# Patient Record
Sex: MALE | Race: WHITE | Employment: UNEMPLOYED | URBAN - NONMETROPOLITAN AREA
[De-identification: names, ages, dates, MRNs, and addresses within clinical notes are randomized per-mention and may not be internally consistent; named-entity substitution may affect disease eponyms.]

---

## 2023-03-03 ENCOUNTER — HOSPITAL ENCOUNTER (OUTPATIENT)
Dept: PSYCHIATRY | Age: 28
Setting detail: THERAPIES SERIES
Discharge: HOME | End: 2023-03-03

## 2023-03-03 PROCEDURE — 80305 DRUG TEST PRSMV DIR OPT OBS: CPT

## 2023-03-03 PROCEDURE — 90791 PSYCH DIAGNOSTIC EVALUATION: CPT

## 2023-03-03 ASSESSMENT — ANXIETY QUESTIONNAIRES
4. TROUBLE RELAXING: 0
1. FEELING NERVOUS, ANXIOUS, OR ON EDGE: 0
7. FEELING AFRAID AS IF SOMETHING AWFUL MIGHT HAPPEN: 0
3. WORRYING TOO MUCH ABOUT DIFFERENT THINGS: 0
GAD7 TOTAL SCORE: 0
IF YOU CHECKED OFF ANY PROBLEMS ON THIS QUESTIONNAIRE, HOW DIFFICULT HAVE THESE PROBLEMS MADE IT FOR YOU TO DO YOUR WORK, TAKE CARE OF THINGS AT HOME, OR GET ALONG WITH OTHER PEOPLE: NOT DIFFICULT AT ALL
2. NOT BEING ABLE TO STOP OR CONTROL WORRYING: 0
5. BEING SO RESTLESS THAT IT IS HARD TO SIT STILL: 0
6. BECOMING EASILY ANNOYED OR IRRITABLE: 0
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
## 2023-03-10 ENCOUNTER — HOSPITAL ENCOUNTER (OUTPATIENT)
Dept: PSYCHIATRY | Age: 28
Setting detail: THERAPIES SERIES
Discharge: HOME | End: 2023-03-10

## 2023-03-10 PROCEDURE — 90834 PSYTX W PT 45 MINUTES: CPT

## 2023-03-10 NOTE — PROGRESS NOTES
[facility]                Progress Note    [] [place]  [x] Keerthi prasad                    Patient Name: Jose Bradley   : 1995     Case # :  [id number]  Therapist: Fletcher Gomez [facility]        Objective/Service/Time:    IT 1.0 Topic: Treatment Planning  S- Client Reports, has job interview today, discussing amnesty program with his PO. Client researching \"weed\" setting boundaries with his roommates to work. O- Client oriented, sleep dreaming more sober from Dev, cooperative    A- Client and this writer discussed his treatment with Dr. Jere Rodriguez, Client discussed 3 treatment goals and level 1 counselling. Client was surprised how high his levels of cannabis are. Client denies any motivation or current emotional issues. Client enjoys meditation was interested to the [facility] device. P- Client will attend Group on Thursday and IT on Friday.                   Fletcher Gomez MA, MILDRED, SHAKEELW, MAC 03/10/23, 2:16 PM
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
## 2023-03-16 ENCOUNTER — HOSPITAL ENCOUNTER (OUTPATIENT)
Dept: PSYCHIATRY | Age: 28
Setting detail: THERAPIES SERIES
Discharge: HOME | End: 2023-03-16

## 2023-03-16 PROCEDURE — 90853 GROUP PSYCHOTHERAPY: CPT

## 2023-03-16 NOTE — GROUP NOTE
612 [facility] Group Therapy Note      3/16/2023    Location:  [place]    Clients Presents: [phone]     Clients Absent: [phone]     Length of session: 1.5 hours    Group Note: OP    Group Type: Co-Ed    New members were welcomed and introduced. Norms and expectations of group were discussed. Content: Clients discussed Changes they want to make in their sober journey, they completed and discussed Smart Recovery CBT. Clients watched and discussed Austyn Patel and Knippanamrata Mcclelland sober journeys and changes they made. Osiel Nobles  3/16/2023 11:44 AM    Co-Therapist: N/A      [facility] Individual Group Progress Note    Jacy Romano  1995  3/16/2023    Notes on Client Progress in Group    Client late arrival confused on his first group, Client stayed after to discuss group content and what he missed. Client goal is to be sober, stay focused on his job and get off probation. Client was tired from working into the night. Client focused, and participated in the discussions. 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     Osiel Nobles  3/16/2023 11:50 AM    Co-Therapist: N/A

## 2023-03-17 ENCOUNTER — APPOINTMENT (OUTPATIENT)
Dept: PSYCHIATRY | Age: 28
End: 2023-03-17

## 2023-03-17 ENCOUNTER — HOSPITAL ENCOUNTER (OUTPATIENT)
Dept: PSYCHIATRY | Age: 28
Setting detail: THERAPIES SERIES
Discharge: HOME | End: 2023-03-17

## 2023-03-17 PROCEDURE — 90834 PSYTX W PT 45 MINUTES: CPT
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
## 2023-03-23 ENCOUNTER — HOSPITAL ENCOUNTER (OUTPATIENT)
Dept: PSYCHIATRY | Age: 28
Setting detail: THERAPIES SERIES
Discharge: HOME | End: 2023-03-23

## 2023-03-23 PROCEDURE — 90853 GROUP PSYCHOTHERAPY: CPT

## 2023-03-23 NOTE — GROUP NOTE
612 [facility] Group Therapy Note      3/23/2023    Location:  Radha Albright    Clients Presents: [phone]    Clients Absent: [phone]    Length of session: 1.5 hours    Group Note: OP    Group Type: Co-Ed    New members were welcomed and introduced. Norms and expectations of group were discussed. Content: Client's discussed the Phases of addiction and based upon their current and past recovery journey. Client's discussed the [employer], Logical Mind and Emotional Mind. [facility]  3/23/2023 11:31 AM    Co-Therapist: N/A      [facility] Individual Group Progress Note    Sadie Baumann  1995  3/23/2023    Notes on Client Progress in Group  Client staying sober, client shared physical symptoms from withdrawing from \"pot\". Client is focused on work, shared how much pot he used and how he currently is staying focused and sober.     [facility]ino [facility]  3/23/2023 11:36 AM    Co-Therapist: N/A

## 2023-03-24 ENCOUNTER — APPOINTMENT (OUTPATIENT)
Dept: PSYCHIATRY | Age: 28
End: 2023-03-24

## 2023-03-24 ENCOUNTER — HOSPITAL ENCOUNTER (OUTPATIENT)
Dept: PSYCHIATRY | Age: 28
Setting detail: THERAPIES SERIES
Discharge: HOME | End: 2023-03-24

## 2023-03-24 PROCEDURE — 90834 PSYTX W PT 45 MINUTES: CPT

## 2023-03-24 PROCEDURE — 80305 DRUG TEST PRSMV DIR OPT OBS: CPT

## 2023-03-30 ENCOUNTER — HOSPITAL ENCOUNTER (OUTPATIENT)
Dept: PSYCHIATRY | Age: 28
Setting detail: THERAPIES SERIES
Discharge: HOME | End: 2023-03-30

## 2023-03-30 PROCEDURE — 90853 GROUP PSYCHOTHERAPY: CPT

## 2023-03-30 NOTE — GROUP NOTE
612 [facility] Group Therapy Note      3/30/2023    Location:  [place]    Clients Presents: [phone]    Clients Absent: [phone]    Length of session: 1.5 hours    Group Note: OP    Group Type: Co-Ed    New members were welcomed and introduced. Norms and expectations of group were discussed. Content: Client's discussed Irena Zee Story of Change, Chain Breaker. Client's shared about conflicts per CBT, Helicopter view. Client began discussion on the Medical Impact drugs, alcohol and food have on the brain. [facility]  3/30/2023 11:21 AM    Co-Therapist: N/A      [facility] Individual Group Progress Note    Darlyn Santiago  1995  3/30/2023    Notes on Client Progress in Group    Client denies use, accepted feedback, told his story of getting fired using the [employer].        [facility]  3/30/2023 11:26 AM    Co-Therapist: N/A

## 2023-03-31 ENCOUNTER — HOSPITAL ENCOUNTER (OUTPATIENT)
Dept: PSYCHIATRY | Age: 28
Setting detail: THERAPIES SERIES
End: 2023-03-31

## 2023-03-31 ENCOUNTER — APPOINTMENT (OUTPATIENT)
Dept: PSYCHIATRY | Age: 28
End: 2023-03-31

## 2023-03-31 PROCEDURE — 90834 PSYTX W PT 45 MINUTES: CPT
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
## 2023-04-06 ENCOUNTER — HOSPITAL ENCOUNTER (OUTPATIENT)
Dept: PSYCHIATRY | Age: 28
Setting detail: THERAPIES SERIES
Discharge: HOME | End: 2023-04-06

## 2023-04-06 PROCEDURE — 90853 GROUP PSYCHOTHERAPY: CPT

## 2023-04-06 NOTE — GROUP NOTE
612 [facility] Group Therapy Note      4/6/2023    Location:  [place]    Clients Presents: [phone]    Clients Absent: [phone]    Length of session: 1.5 hours    Group Note: OP    Group Type: Co-Ed    New members were welcomed and introduced. Norms and expectations of group were discussed. Content: Client's shared and viewed DAbram 1983 Altha Denver speech on falling forward, Client's discussed the 12 Steps and Virtues, Client's viewed and discussed Father Vanessa Staff Step 1    Patricia [facility]  4/6/2023 11:22 AM    Co-Therapist: N/A      [facility] Individual Group Progress Note    Jacy Romano  1995 4/6/2023    Notes on Client Progress in Group    Client attentive, shared about his Xanax addiction, Client shared about the Virtue of Discipline and spirituality. Client enjoying his new job and sober living.     [facility]  4/6/2023 11:27 AM    Co-Therapist: N/A

## 2023-04-07 ENCOUNTER — APPOINTMENT (OUTPATIENT)
Dept: PSYCHIATRY | Age: 28
End: 2023-04-07
Payer: MEDICAID

## 2023-04-14 ENCOUNTER — APPOINTMENT (OUTPATIENT)
Dept: PSYCHIATRY | Age: 28
End: 2023-04-14
Payer: MEDICAID

## 2023-04-20 ENCOUNTER — HOSPITAL ENCOUNTER (OUTPATIENT)
Dept: PSYCHIATRY | Age: 28
Setting detail: THERAPIES SERIES
Discharge: HOME | End: 2023-04-20
Payer: MEDICAID
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
## 2023-04-21 ENCOUNTER — APPOINTMENT (OUTPATIENT)
Dept: PSYCHIATRY | Age: 28
End: 2023-04-21
Payer: MEDICAID

## 2023-04-21 ENCOUNTER — HOSPITAL ENCOUNTER (OUTPATIENT)
Dept: PSYCHIATRY | Age: 28
Setting detail: THERAPIES SERIES
Discharge: HOME | End: 2023-04-21
Payer: MEDICAID

## 2023-04-21 PROCEDURE — 90832 PSYTX W PT 30 MINUTES: CPT
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
## 2023-04-27 ENCOUNTER — HOSPITAL ENCOUNTER (OUTPATIENT)
Dept: PSYCHIATRY | Age: 28
Setting detail: THERAPIES SERIES
Discharge: HOME | End: 2023-04-27
Payer: MEDICAID

## 2023-04-27 PROCEDURE — 90853 GROUP PSYCHOTHERAPY: CPT
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
## 2023-04-28 ENCOUNTER — HOSPITAL ENCOUNTER (OUTPATIENT)
Dept: PSYCHIATRY | Age: 28
Setting detail: THERAPIES SERIES
Discharge: HOME | End: 2023-04-28
Payer: MEDICAID

## 2023-04-28 ENCOUNTER — APPOINTMENT (OUTPATIENT)
Dept: PSYCHIATRY | Age: 28
End: 2023-04-28
Payer: MEDICAID

## 2023-04-28 PROCEDURE — 90834 PSYTX W PT 45 MINUTES: CPT

## 2023-04-28 PROCEDURE — 80305 DRUG TEST PRSMV DIR OPT OBS: CPT
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
## 2023-05-04 ENCOUNTER — HOSPITAL ENCOUNTER (OUTPATIENT)
Dept: PSYCHIATRY | Age: 28
Setting detail: THERAPIES SERIES
Discharge: HOME | End: 2023-05-04
Payer: MEDICAID

## 2023-05-04 NOTE — GROUP NOTE
612 [facility] Group Therapy Note      5/4/2023    Location:  [place]    Clients Presents: [phone]    Clients Absent: [phone]    Length of session: 1.5 hours    Group Note: OP    Group Type: Co-Ed    New members were welcomed and introduced. Norms and expectations of group were discussed.     Content: Client shared and discussed  Taking Inventory and viewed & discussed Smart recovery Values video,   Client gave peer feedback and support    Osiel Carbajal  5/4/2023 11:59 AM    Co-Therapist: N/A      [facility] Individual Group Progress Note    Elian Bird  1995 5/4/2023    Notes on Client Progress in Group    Reason for Absence: CXL resides in [place] no ride today    Osiel Carbajal  5/4/2023 12:02 PM    Co-Therapist: N/A

## 2023-05-05 ENCOUNTER — APPOINTMENT (OUTPATIENT)
Dept: PSYCHIATRY | Age: 28
End: 2023-05-05
Payer: MEDICAID

## 2023-05-05 ENCOUNTER — HOSPITAL ENCOUNTER (OUTPATIENT)
Dept: PSYCHIATRY | Age: 28
Setting detail: THERAPIES SERIES
Discharge: HOME | End: 2023-05-05
Payer: MEDICAID

## 2023-05-05 PROCEDURE — 90832 PSYTX W PT 30 MINUTES: CPT

## 2023-05-05 NOTE — PROGRESS NOTES
[facility]                Progress Note    [] [place]  [x] Margarette Rowley                    Patient Name: Monika Deras   : 1995     Case # :  [id number]  Therapist: Antelmo Feliz [facility]        Objective/Service/Time:    IT   30 Minutes, Topics:  resources for sober supports, MH supports 1   The Client stated their name and soc #. Client agreed to Telehealth and reported in a Confidential setting. Client and counselor will call back if there is a disconnect. Client has the crisis # for  needs. S-Client reports, very encouraged about his UDS, motivated, clear minded since being off of marijuana. Client motivated by his new job and his mentor. Client states he is diligent in work, learning and doing extra's at the [address] Client is on PRN status with his Psychiatrist.   Client has not attending NA/AA,  is aware of the [facility] group. Client is waiting for Medical card. O- Client is oriented, focused engaged, interested in learning  A- Reviewed goals,  his health resources, and research on Marijuana & discussed benefits of AA/NA  P- Client working, reporting into PO, Client is going to network about glasses through the [employer].                   Antelmo Feilz MA, MILDRED, LSW, MAC 23, 12:10 PM

## 2023-05-11 ENCOUNTER — HOSPITAL ENCOUNTER (OUTPATIENT)
Dept: PSYCHIATRY | Age: 28
Setting detail: THERAPIES SERIES
Discharge: HOME | End: 2023-05-11
Payer: MEDICAID

## 2023-05-11 PROCEDURE — 90853 GROUP PSYCHOTHERAPY: CPT

## 2023-05-11 NOTE — GROUP NOTE
612 [facility] Group Therapy Note      5/11/2023    Location:  [facility]    Clients Presents: [phone]        Length of session: 1.5 hours    Group Note: OP    Group Type: Co-Ed    New members were welcomed and introduced. Norms and expectations of group were discussed. Content: Client's discussed and viewed the hope, resilience story of Rula Sanchez and the parallels to recovery. Client 's learned the CBT tool from Smart Recovery processing old beliefs, emotions, disputing old beliefs and developing new beliefs. [facility]  5/11/2023 11:58 AM    Co-Therapist: N/A      [facility] Individual Group Progress Note    [facility]  1995 5/11/2023    Notes on Client Progress in Group    Client was attentive, applied information, shared about benefits of him being sober and motivated.     [facility]  5/11/2023 12:02 PM    Co-Therapist: N/A

## 2023-05-12 ENCOUNTER — HOSPITAL ENCOUNTER (OUTPATIENT)
Dept: PSYCHIATRY | Age: 28
Setting detail: THERAPIES SERIES
Discharge: HOME | End: 2023-05-12
Payer: MEDICAID

## 2023-05-12 ENCOUNTER — APPOINTMENT (OUTPATIENT)
Dept: PSYCHIATRY | Age: 28
End: 2023-05-12
Payer: MEDICAID

## 2023-05-12 PROCEDURE — 90832 PSYTX W PT 30 MINUTES: CPT

## 2023-05-12 NOTE — PROGRESS NOTES
[facility]                Progress Note    [] [place]  [x] Keerthi prasad                    Patient Name: Yissel Victoria   : 1995     Case # :  [id number]  Therapist: Piper Baird [facility]        Objective/Service/Time:    IT      30 Minutes,        2 calls , 1216 pm, 1225 pm  The Client stated their name and soc #. Client agreed to Telehealth and reported in a Confidential setting. Client and counselor will call back if there is a disconnect. Client has the crisis # for  needs. 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 Client stated he walks to work, staying positive and changing his belief about abusing marijuana, at this time her reports he plans to [facility]" use it again. O-Client displays friendliness, shares freely and affect a lot of humor and laughing      A-Client and this writer discussed sober stories and inspirational stories. Client is interested in podcast and reading more about Jessica Crain sober from drugs and xanax.         P-Client attends IT, Gt, Works, monthly check ins, wanting to get his DL back and has a Psychiatrist MANUEL Baird MA, [facility], SHAKEEL, [facility] 23, 12:13 PM

## 2023-05-18 ENCOUNTER — HOSPITAL ENCOUNTER (OUTPATIENT)
Dept: PSYCHIATRY | Age: 28
Setting detail: THERAPIES SERIES
Discharge: HOME | End: 2023-05-18
Payer: MEDICAID

## 2023-05-18 PROCEDURE — 90853 GROUP PSYCHOTHERAPY: CPT

## 2023-05-18 PROCEDURE — 80305 DRUG TEST PRSMV DIR OPT OBS: CPT

## 2023-05-18 NOTE — GROUP NOTE
612 [facility] Group Therapy Note      5/18/2023    Location:  [employer]    Clients Presents: [phone]    Clients Absent: [phone]    Length of session: 1.5 hours    Group Note: OP    Group Type: Co-Ed    New members were welcomed and introduced. Norms and expectations of group were discussed. Content: Client's discussed correcting Negative thinking in relationship to maintaining a sober life. Client's watched the [employer] Story and discussed Transformational Change. Bhumi [facility]  5/18/2023 11:28 AM    Co-Therapist: N/A      [facility] Individual Group Progress Note    Yvonne Ferro  1995 5/18/2023    Notes on Client Progress in Group    Client enjoying his job and opportunities. Client cut his finger while prepping food. Client meeting his goals and concerned about his brother who had court today.     Bhumi [facility]  5/18/2023 11:32 AM    Co-Therapist: N/A

## 2023-05-19 ENCOUNTER — HOSPITAL ENCOUNTER (OUTPATIENT)
Dept: PSYCHIATRY | Age: 28
Setting detail: THERAPIES SERIES
Discharge: HOME | End: 2023-05-19
Payer: MEDICAID

## 2023-05-19 ENCOUNTER — APPOINTMENT (OUTPATIENT)
Dept: PSYCHIATRY | Age: 28
End: 2023-05-19
Payer: MEDICAID

## 2023-05-19 PROCEDURE — 90832 PSYTX W PT 30 MINUTES: CPT

## 2023-05-19 NOTE — PROGRESS NOTES
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 Problem: Coping with unemployment, past and current stressors, and long distance relationship from family (mother out of the state)   Goal: Maintain and developing a coping and work/life plan in 90 days   Objectives:   1) Discuss Bridges education on Transitions and name 2 skills from the information in 90 days  Evaluation Date: 06/10/23 Code: C Continue TBD   2) Discuss 7 Prosocial skills in 90 days Evaluation Date: 6/10/23 Code: C Continue TBD   3) Client discuss 2 benefits of supportive coaching and counselling at the [facility] and Dr. Ole Canales) in 90 days Evaluation Date: 6/10/23 Code: Yolanda Montero on 5/18/23    3. Problem: Community Control for several possession charges in [facility] and Current referral for Rene Varghese 1313 charge in [facility]. Goal: Meet the expectations of Maureen Collado in 90 days

## 2023-05-19 NOTE — PROGRESS NOTES
[facility] ROJAS                Progress Note    [] [place]  [x] Keerthi prasad                    Patient Name: Acacia Calvin   : 1995     Case # :  [id number]  Therapist: Serena Parish [facility]        Objective/Service/Time:    IT Client 30 Minutes telehealth,  reviewed treatment goals 1, 2 and 3  S- Client reports working, starting to date, spending time with brother, his girlfriend and checks in with PO next week. Client enjoys serving and connecting with the elderly at the [facility] center. Client has vivid nightmares, client called Dr. Oliver Ackerman for medical advice and . O- Client aware of emotions, motivated, using resources and finding peer supports per his report  A- Client reports goal 1 obj 1- staying sober per working, spending time with his dogs, has a positive sober friend and researching the negative impacts of Marijuana. Goal 1/2 Client enjoys time with his dogs, online reading and hopes to ride dirt bikes soon. Goal 2/3 had an appt. With Dr. Oliver Ackerman for his nightmares  Goal 3/1 Client meeting with his PO next week and hopes to be able to apply for his DL  Client talked a lot about his new relationships and how he respects her for her sober living and parenting a 10 month old by herself. P- Client  reports working, PRN medical appointments, playing with his dogs and connecting with a sober friend are his sober supports.                   Serena Parish MA, MILDRED, SHAKEELW, MAC 23, 12:56 PM

## 2023-05-25 ENCOUNTER — HOSPITAL ENCOUNTER (OUTPATIENT)
Dept: PSYCHIATRY | Age: 28
Setting detail: THERAPIES SERIES
Discharge: HOME | End: 2023-05-25
Payer: MEDICAID

## 2023-05-25 PROCEDURE — 90853 GROUP PSYCHOTHERAPY: CPT

## 2023-05-25 NOTE — GROUP NOTE
612 [facility] Group Therapy Note      5/25/2023    Location:  [place]    Clients Presents: [phone]    Clients Absent: 21     Length of session: 1.5 hours    Group Note: OP    Group Type: Co-Ed    New members were welcomed and introduced. Norms and expectations of group were discussed. Content: Client celebrated with group member  who completed treatment, Client's discussed coping with chronic illnesses and coping with unmanageability in regards to sober living. Client's discussed the SAbram Massey recovery story. [facility]  5/25/2023 12:07 PM    Co-Therapist: N/A      [facility] Individual Group Progress Note    Annelise Vicente  1995 5/25/2023    Notes on Client Progress in Group  Client continues to be motivated and pleased he has stopped abusing marijuana. Client shared how he was powerless over Xanax and his recovery journey.     Alejandro Oh [facility]  5/25/2023 12:10 PM    Co-Therapist: N/A

## 2023-05-26 ENCOUNTER — APPOINTMENT (OUTPATIENT)
Dept: PSYCHIATRY | Age: 28
End: 2023-05-26
Payer: MEDICAID

## 2023-06-01 ENCOUNTER — HOSPITAL ENCOUNTER (OUTPATIENT)
Dept: PSYCHIATRY | Age: 28
Setting detail: THERAPIES SERIES
Discharge: HOME | End: 2023-06-01
Payer: MEDICAID

## 2023-06-01 PROCEDURE — 90853 GROUP PSYCHOTHERAPY: CPT

## 2023-06-01 NOTE — GROUP NOTE
612 [facility] Group Therapy Note      6/1/2023    Location:  [place]    Clients Presents: [phone]    Clients Absent: [phone]    Length of session: 1.5 hours    Group Note: OP    Group Type: Co-Ed    New members were welcomed and introduced. Norms and expectations of group were discussed. Content: Client's discussed urges, the Helicopter coping tool and Father Pk Dempsey. Client's supported a peer who is having chronic health issues. Scott Huitron [facility]  6/1/2023 11:52 AM    Co-Therapist: N/A      [facility] Individual Group Progress Note    Cherie Oneal  1995 6/1/2023    Notes on Client Progress in Group    Client confidence building working as a  40 hours a week, Client is getting excited about getting off of probation moving to [place] with his mother to support her. Client responded to the Father Jamarimaria dolores Juan tape on Moral inventory.     Scott Huitron [facility]  6/1/2023 11:55 AM    Co-Therapist: N/A

## 2023-06-02 ENCOUNTER — APPOINTMENT (OUTPATIENT)
Dept: PSYCHIATRY | Age: 28
End: 2023-06-02
Payer: MEDICAID

## 2023-06-02 ENCOUNTER — HOSPITAL ENCOUNTER (OUTPATIENT)
Dept: PSYCHIATRY | Age: 28
Setting detail: THERAPIES SERIES
Discharge: HOME | End: 2023-06-02
Payer: MEDICAID

## 2023-06-02 PROCEDURE — 90832 PSYTX W PT 30 MINUTES: CPT

## 2023-06-02 NOTE — PROGRESS NOTES
[facility] ROJAS                Progress Note    [] [place]  [x] Keerthi prasad                    Patient Name: Ean Mcgrath   : 1995     Case # :  [id number]  Therapist: Napoleon Rodriguez [facility]        Objective/Service/Time:    IT 30 Minutes, The Client stated their name and soc #. Client agreed to Telehealth and reported in a Confidential setting. Client and counselor will call back if there is a disconnect. Client has the crisis # for  needs. Topic:  Reviewing goals    S- Client reports, motivated by work, getting off probation, and welcoming new niece. Client shares motivated by learning through his . Client wants to move near his sober mother. Client will be permitted- to go to court. Client will continue with Dr. Chad Lopez online on the phone. O- Client oriented, engaged, focused        A-    Client is motivated by his future and sober living & happy to have money in his pocket. Client is not currently dating. Client setting goals and stay motivated. Client is working on Sat and hopes to sign up for Amnesty and pay for his DL. Client and this writer discussed Change vs Transition in his life and currently with planning to move, Sowmya William material Transitions. P- Client will complete GT, and tentative completion next week, Client has plan with Dr. Chad Lopez. Explore transitions and how to make the change.                   Napoleon Rodriguez MA, MILDRED, SHAKEELW, MAC 23, 12:32 PM

## 2023-06-02 NOTE — PROGRESS NOTES
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 Problem: Coping with unemployment, past and current stressors, and long distance relationship from family (mother out of the state)   Goal: Maintain and developing a coping and work/life plan in 90 days   Objectives:   1) Discuss Bridges education on Transitions and name 2 skills from the information in 90 days  Evaluation Date: 06/10/23 Code: C Continue TBD  Client discussed transitions vs change 6/02/23 achieved  2) Discuss 7 Prosocial skills in 90 days Evaluation Date: 6/10/23 Code: C Continue TBD   3) Client discuss 2 benefits of supportive coaching and counselling at the [facility] and Dr. Marilu Coats in 90 days Evaluation Date: 6/10/23 Code: Tomi Escoto on 5/18/23    3. Problem: Community Control for several possession charges in [facility] and Current referral for Rene Varghese 1313 charge in [facility].    Goal: Meet the

## 2023-06-08 ENCOUNTER — HOSPITAL ENCOUNTER (OUTPATIENT)
Dept: PSYCHIATRY | Age: 28
Setting detail: THERAPIES SERIES
Discharge: HOME | End: 2023-06-08
Payer: MEDICAID

## 2023-06-08 PROCEDURE — 80305 DRUG TEST PRSMV DIR OPT OBS: CPT

## 2023-06-08 PROCEDURE — 90853 GROUP PSYCHOTHERAPY: CPT

## 2023-06-08 NOTE — GROUP NOTE
612 [facility] Group Therapy Note      6/8/2023    Location:  [employer]    Clients Presents: [phone]        Length of session: 1.5 hours    Group Note: OP    Group Type: Co-Ed    New members were welcomed and introduced. Norms and expectations of group were discussed. Content: Client 's discussed emotional, wise and logical mind in recovery. Client's discussed change and transition in sober living viewed & related to Who Moved [facility]. Ana Yuan "[employer]  6/8/2023 12:06 PM    Co-Therapist: N/A      [facility] Individual Group Progress Note    Diego Quintero  1995 6/8/2023    Notes on Client Progress in Group    Client completed group and discussed his transitions from being addicted to Marijuana to staying sober. Client reported excitement being an Uncle and hoping to move to [place] after probation. Client was attentive, focused and shared freely.       Ana Yuan [employer]  6/8/2023 12:10 PM    Co-Therapist: N/A

## 2023-06-09 ENCOUNTER — HOSPITAL ENCOUNTER (OUTPATIENT)
Dept: PSYCHIATRY | Age: 28
Setting detail: THERAPIES SERIES
Discharge: HOME | End: 2023-06-09
Payer: MEDICAID

## 2023-06-09 PROCEDURE — 90832 PSYTX W PT 30 MINUTES: CPT

## 2023-06-09 NOTE — PROGRESS NOTES
612 [facility] Discharge Summary    Elvira Monet  1995  Case # [id number]    Location: [] [place] [x] [place]    Admission Date: 3/3/23    Date of last service: 6/9/2023    Therapist: Lupillo Argueta 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, hypnotic or anxiolytic dependence-unspecified and F12.20 Cannabis dependence-unspecified use         Service:   assessment,   Individual wk, Group assigned Wk, and Urinalysis Random    Level of care at ADMISSIONS: 1 Outpatient Services    Level of care at DISCHARGE : 1 Outpatient Services    Client's Outcomes Treatment: (Review of participation, successes, insight, etc.)   Client met all IT, GT rules, client follow up with his medical providers    Service History Appointments: 25Scheduled 23Kept 2ancelled     Discharge Code: B completed treatment program    Reason for discharge: Client completed goals, Client moving out of state, Client will continue with Medical Care under Dr. Damián Flores    Recommendations/Referrals: Client will pursue vocational goals, plans to move out of state, Client will continue under the care of Dr. Damián Flores for [address] counseling and medical supports. Upon involuntary termination from service, client has received Client Rights as to their right to file an appeal.    Adult/Adolescent Discharge  Level of Care Criteria to be completed separately       [facility]   6/9/2023 / 9:02 AM       Medical Director     MARTHA Navarro MD    Signature: